# Patient Record
Sex: FEMALE | Race: WHITE | NOT HISPANIC OR LATINO
[De-identification: names, ages, dates, MRNs, and addresses within clinical notes are randomized per-mention and may not be internally consistent; named-entity substitution may affect disease eponyms.]

---

## 2021-03-01 ENCOUNTER — RESULT REVIEW (OUTPATIENT)
Age: 31
End: 2021-03-01

## 2021-04-06 PROBLEM — Z00.00 ENCOUNTER FOR PREVENTIVE HEALTH EXAMINATION: Status: ACTIVE | Noted: 2021-04-06

## 2021-04-07 ENCOUNTER — APPOINTMENT (OUTPATIENT)
Dept: OTOLARYNGOLOGY | Facility: CLINIC | Age: 31
End: 2021-04-07

## 2022-03-01 ENCOUNTER — INPATIENT (INPATIENT)
Facility: HOSPITAL | Age: 32
LOS: 5 days | Discharge: HOME | End: 2022-03-07
Attending: PSYCHIATRY & NEUROLOGY | Admitting: PSYCHIATRY & NEUROLOGY
Payer: COMMERCIAL

## 2022-03-01 VITALS
TEMPERATURE: 98 F | SYSTOLIC BLOOD PRESSURE: 125 MMHG | DIASTOLIC BLOOD PRESSURE: 73 MMHG | HEART RATE: 105 BPM | OXYGEN SATURATION: 100 % | RESPIRATION RATE: 20 BRPM

## 2022-03-01 DIAGNOSIS — F60.3 BORDERLINE PERSONALITY DISORDER: ICD-10-CM

## 2022-03-01 DIAGNOSIS — F90.9 ATTENTION-DEFICIT HYPERACTIVITY DISORDER, UNSPECIFIED TYPE: ICD-10-CM

## 2022-03-01 DIAGNOSIS — F43.10 POST-TRAUMATIC STRESS DISORDER, UNSPECIFIED: ICD-10-CM

## 2022-03-01 DIAGNOSIS — F41.9 ANXIETY DISORDER, UNSPECIFIED: ICD-10-CM

## 2022-03-01 DIAGNOSIS — F33.2 MAJOR DEPRESSIVE DISORDER, RECURRENT SEVERE WITHOUT PSYCHOTIC FEATURES: ICD-10-CM

## 2022-03-01 LAB
ALBUMIN SERPL ELPH-MCNC: 4.8 G/DL — SIGNIFICANT CHANGE UP (ref 3.3–5)
ALP SERPL-CCNC: 40 U/L — SIGNIFICANT CHANGE UP (ref 40–120)
ALT FLD-CCNC: 11 U/L — SIGNIFICANT CHANGE UP (ref 4–33)
ANION GAP SERPL CALC-SCNC: 12 MMOL/L — SIGNIFICANT CHANGE UP (ref 7–14)
APAP SERPL-MCNC: <10 UG/ML — LOW (ref 15–25)
AST SERPL-CCNC: 14 U/L — SIGNIFICANT CHANGE UP (ref 4–32)
BASOPHILS # BLD AUTO: 0.04 K/UL — SIGNIFICANT CHANGE UP (ref 0–0.2)
BASOPHILS NFR BLD AUTO: 0.6 % — SIGNIFICANT CHANGE UP (ref 0–2)
BILIRUB SERPL-MCNC: 0.3 MG/DL — SIGNIFICANT CHANGE UP (ref 0.2–1.2)
BUN SERPL-MCNC: 5 MG/DL — LOW (ref 7–23)
CALCIUM SERPL-MCNC: 9.5 MG/DL — SIGNIFICANT CHANGE UP (ref 8.4–10.5)
CHLORIDE SERPL-SCNC: 105 MMOL/L — SIGNIFICANT CHANGE UP (ref 98–107)
CO2 SERPL-SCNC: 25 MMOL/L — SIGNIFICANT CHANGE UP (ref 22–31)
CREAT SERPL-MCNC: 0.63 MG/DL — SIGNIFICANT CHANGE UP (ref 0.5–1.3)
EGFR: 122 ML/MIN/1.73M2 — SIGNIFICANT CHANGE UP
EOSINOPHIL # BLD AUTO: 0.02 K/UL — SIGNIFICANT CHANGE UP (ref 0–0.5)
EOSINOPHIL NFR BLD AUTO: 0.3 % — SIGNIFICANT CHANGE UP (ref 0–6)
ETHANOL SERPL-MCNC: <10 MG/DL — SIGNIFICANT CHANGE UP
FLUAV AG NPH QL: SIGNIFICANT CHANGE UP
FLUBV AG NPH QL: SIGNIFICANT CHANGE UP
GLUCOSE SERPL-MCNC: 81 MG/DL — SIGNIFICANT CHANGE UP (ref 70–99)
HCG SERPL-ACNC: <5 MIU/ML — SIGNIFICANT CHANGE UP
HCT VFR BLD CALC: 38.7 % — SIGNIFICANT CHANGE UP (ref 34.5–45)
HGB BLD-MCNC: 12.7 G/DL — SIGNIFICANT CHANGE UP (ref 11.5–15.5)
IANC: 3.75 K/UL — SIGNIFICANT CHANGE UP (ref 1.5–8.5)
IMM GRANULOCYTES NFR BLD AUTO: 0.2 % — SIGNIFICANT CHANGE UP (ref 0–1.5)
LYMPHOCYTES # BLD AUTO: 1.88 K/UL — SIGNIFICANT CHANGE UP (ref 1–3.3)
LYMPHOCYTES # BLD AUTO: 30.2 % — SIGNIFICANT CHANGE UP (ref 13–44)
MCHC RBC-ENTMCNC: 29.1 PG — SIGNIFICANT CHANGE UP (ref 27–34)
MCHC RBC-ENTMCNC: 32.8 GM/DL — SIGNIFICANT CHANGE UP (ref 32–36)
MCV RBC AUTO: 88.8 FL — SIGNIFICANT CHANGE UP (ref 80–100)
MONOCYTES # BLD AUTO: 0.52 K/UL — SIGNIFICANT CHANGE UP (ref 0–0.9)
MONOCYTES NFR BLD AUTO: 8.4 % — SIGNIFICANT CHANGE UP (ref 2–14)
NEUTROPHILS # BLD AUTO: 3.75 K/UL — SIGNIFICANT CHANGE UP (ref 1.8–7.4)
NEUTROPHILS NFR BLD AUTO: 60.3 % — SIGNIFICANT CHANGE UP (ref 43–77)
NRBC # BLD: 0 /100 WBCS — SIGNIFICANT CHANGE UP
NRBC # FLD: 0 K/UL — SIGNIFICANT CHANGE UP
PCP SPEC-MCNC: SIGNIFICANT CHANGE UP
PLATELET # BLD AUTO: 285 K/UL — SIGNIFICANT CHANGE UP (ref 150–400)
POTASSIUM SERPL-MCNC: 4.3 MMOL/L — SIGNIFICANT CHANGE UP (ref 3.5–5.3)
POTASSIUM SERPL-SCNC: 4.3 MMOL/L — SIGNIFICANT CHANGE UP (ref 3.5–5.3)
PROT SERPL-MCNC: 7.4 G/DL — SIGNIFICANT CHANGE UP (ref 6–8.3)
RBC # BLD: 4.36 M/UL — SIGNIFICANT CHANGE UP (ref 3.8–5.2)
RBC # FLD: 12.7 % — SIGNIFICANT CHANGE UP (ref 10.3–14.5)
RSV RNA NPH QL NAA+NON-PROBE: SIGNIFICANT CHANGE UP
SALICYLATES SERPL-MCNC: <0.3 MG/DL — LOW (ref 15–30)
SARS-COV-2 RNA SPEC QL NAA+PROBE: SIGNIFICANT CHANGE UP
SODIUM SERPL-SCNC: 142 MMOL/L — SIGNIFICANT CHANGE UP (ref 135–145)
TSH SERPL-MCNC: 0.9 UIU/ML — SIGNIFICANT CHANGE UP (ref 0.27–4.2)
WBC # BLD: 6.22 K/UL — SIGNIFICANT CHANGE UP (ref 3.8–10.5)
WBC # FLD AUTO: 6.22 K/UL — SIGNIFICANT CHANGE UP (ref 3.8–10.5)

## 2022-03-01 PROCEDURE — 99285 EMERGENCY DEPT VISIT HI MDM: CPT

## 2022-03-01 PROCEDURE — 99284 EMERGENCY DEPT VISIT MOD MDM: CPT

## 2022-03-01 RX ORDER — CLONAZEPAM 1 MG
1 TABLET ORAL
Refills: 0 | Status: DISCONTINUED | OUTPATIENT
Start: 2022-03-01 | End: 2022-03-07

## 2022-03-01 RX ORDER — HYDROXYZINE HCL 10 MG
25 TABLET ORAL EVERY 6 HOURS
Refills: 0 | Status: DISCONTINUED | OUTPATIENT
Start: 2022-03-01 | End: 2022-03-07

## 2022-03-01 RX ORDER — GABAPENTIN 400 MG/1
400 CAPSULE ORAL
Refills: 0 | Status: DISCONTINUED | OUTPATIENT
Start: 2022-03-01 | End: 2022-03-04

## 2022-03-01 RX ORDER — LAMOTRIGINE 25 MG/1
200 TABLET, ORALLY DISINTEGRATING ORAL DAILY
Refills: 0 | Status: DISCONTINUED | OUTPATIENT
Start: 2022-03-01 | End: 2022-03-04

## 2022-03-01 RX ORDER — LANOLIN ALCOHOL/MO/W.PET/CERES
3 CREAM (GRAM) TOPICAL AT BEDTIME
Refills: 0 | Status: DISCONTINUED | OUTPATIENT
Start: 2022-03-02 | End: 2022-03-07

## 2022-03-01 NOTE — ED BEHAVIORAL HEALTH ASSESSMENT NOTE - RISK ASSESSMENT
High Acute Suicide Risk elevated risk of harm at this time given multiple past SA, SI, insomnia, depressed mood, impulsive behavior.   protective factors include good therapeutic relationships, good social support from  and motivated to get treatment.   Pt at elevated risk of harm given above risk factors and would benefit from inpatient psych hospitalization.

## 2022-03-01 NOTE — ED PROVIDER NOTE - OBJECTIVE STATEMENT
this is a 31 y.o female with a PMhx of Depression, anxiety, borderline personality disorder, Bipolar? presented to the ED complaining of having suicidal ideations and attempt for the past several months, patient states that she attempted to take a extra dose of klonopin, as a overdose. Patient saw her psychiatrist today whom told her to come in for her Suicidal attempts, patient states that she has attempted in the past, she also has been to the hospital in the past however hasn't been admitted. Patient denies having any nausea, vomiting, diarrhea, consitpation, CP, SOB, FOWLER, headaches, dizziness, fever, or chills. Pt does self harming behavior such as head banging. Patient willing to sign herself into the hospital.

## 2022-03-01 NOTE — ED BEHAVIORAL HEALTH NOTE - BEHAVIORAL HEALTH NOTE
COVID Exposure Screen- Patient  1.	*Have you had a COVID-19 test in the last 90 days?  (  ) Yes   (x  ) No   (  ) Unknown- Reason: _____  IF YES PROCEED TO QUESTION #2. IF NO OR UNKNOWN, PLEASE SKIP TO QUESTION #3.  2.	Date of test(s) and result(s): ________  3.	*Have you tested positive for COVID-19 antibodies? (  ) Yes   ( x) No   (  ) Unknown- Reason: _____  IF YES PROCEED TO QUESTION #4. IF NO or UNKNOWN, PLEASE SKIP TO QUESTION #5.  4.	Date of positive antibody test: ________  5.	*Have you received 2 doses of the COVID-19 vaccine? ( x ) Yes   (  ) No   (  ) Unknown- Reason: _____   IF YES PROCEED TO QUESTION #6. IF NO or UNKNOWN, PLEASE SKIP TO QUESTION #7.  6.	Date of second dose: MAY 2021 - MODERNA________  7.	*In the past 10 days, have you been around anyone with a positive COVID-19 test?* (  ) Yes   (X  ) No   (  ) Unknown- Reason: ____  IF YES PROCEED TO QUESTION #8. IF NO or UNKNOWN, PLEASE SKIP TO QUESTION #13.  8.	Were you within 6 feet of them for at least 15 minutes? (  ) Yes   (  ) No   (  ) Unknown- Reason: _____  9.	Have you provided care for them? (  ) Yes   (  ) No   (  ) Unknown- Reason: ______  10.	Have you had direct physical contact with them (touched, hugged, or kissed them)? (  ) Yes   (  ) No    (  ) Unknown- Reason: _____  11.	Have you shared eating or drinking utensils with them? (  ) Yes   (  ) No    (  ) Unknown- Reason: ____  12.	Have they sneezed, coughed, or somehow gotten respiratory droplets on you? (  ) Yes   (  ) No    (  ) Unknown- Reason: ______  13.	*Have you been out of [place] within the past 10 days?* (  ) Yes   ( X ) No   (  ) Unknown- Reason: _____  IF YES PLEASE ANSWER THE FOLLOWING QUESTIONS:  14.	Which state/country have you been to? ______  15.	Were you there over 24 hours? (  ) Yes   (  ) No    (  ) Unknown- Reason: ______  16.	Date of return to [facility]: ______

## 2022-03-01 NOTE — ED PROVIDER NOTE - CLINICAL SUMMARY MEDICAL DECISION MAKING FREE TEXT BOX
this is a 31 y.o female with a PMhx of Depression, anxiety, borderline personality disorder, Bipolar? presented to the ED complaining of having suicidal ideations and attempt for the past several months. Suicidal ideations-labs, ekg, psych

## 2022-03-01 NOTE — ED ADULT NURSE NOTE - CHIEF COMPLAINT QUOTE
pt was sent is by therapist for si. Pt states she is having thoughts of harming herself, denies plan. pt denies HI,AVH, etoh and drug uses. pmh depression anxiety ,borderline personality.   Juwan ( ) [phone]

## 2022-03-01 NOTE — ED BEHAVIORAL HEALTH ASSESSMENT NOTE - PSYCHIATRIC ISSUES AND PLAN (INCLUDE STANDING AND PRN MEDICATION)
WILL HOLD ADDERALL, C/W melatonin 3mg po qhs,  klonopin 1mg po bid PRN, Lamictal 200mg po daily, Gabapentin 400mg po 4x a day, PRNS: Ativan 2mg IM q6hrs prn, atarax 50mg po q6hrs prn for anxiety WILL HOLD ADDERALL, C/W melatonin 3mg po qhs,  klonopin 1mg po bid PRN, Lamictal 200mg po daily, Gabapentin 400mg po 4x a day, PRNS: Ativan 2mg IM q6hrs prn, atarax 25mg po q6hrs prn for anxiety

## 2022-03-01 NOTE — ED BEHAVIORAL HEALTH ASSESSMENT NOTE - CURRENT MEDICATION
Adderal XR 30mg AM, Adderal IR 10mg 3:30p, Gabapentin 400mg po 4x a week, Klonopin 1mg po bid prn, Lamictal 200mg po daily

## 2022-03-01 NOTE — ED BEHAVIORAL HEALTH ASSESSMENT NOTE - BEHAVIOR
Discussed the importance of blood sugar control in the prevention of ocular complications. Cooperative

## 2022-03-01 NOTE — ED BEHAVIORAL HEALTH NOTE - BEHAVIORAL HEALTH NOTE
GAVINO called pt's , Juwan, at [phone] for collateral information.  No answer; call went to voice mail-GAVINO left a message requesting a return phone call. GAVINO called pt's , Juwan, at [phone] for collateral information.  No answer; call went to voice mail-SW left a message requesting a return phone call.    GAVINO follow up with pt's  again re: collateral information.  Pt's  reports that pt has a history of depression, and that her depression has increased lately.  He states that yesterday he and pt had en emergency meeting with her psychologist, and during that time pt disclosed that she 'fights so hard to stay alive' and that it was 'because of him' that she stays alive.  In addition, pt's  reports that she then 'lost it' and 'couldn't do it anymore'.  Pt's  states that pt's psychiatrist was called as well, and both providers recommended that pt be seen in the ED.  Pt's  also states that pt has not been sleeping well, states she is not at her baseline.  Pt's  reports that pt stated her plan to kill herself was via overdose on her medications.  He states that in the past pt has had thoughts of going in front of a train, however she has not verbalized that manner in years.  Pt's  states the pt is prescribed Gabapentin, Lamotrigine, Adderal, and Klonipin (does not take often).      Therapist is Dr. Terry Malagon, [phone].
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
## 2022-03-01 NOTE — ED BEHAVIORAL HEALTH ASSESSMENT NOTE - DESCRIPTION
calm, cooperative, tearful  Vital Signs Last 24 Hrs  T(C): 36.6 (01 Mar 2022 20:32), Max: 36.6 (01 Mar 2022 20:32)  T(F): 97.8 (01 Mar 2022 20:32), Max: 97.8 (01 Mar 2022 20:32)  HR: 105 (01 Mar 2022 20:32) (105 - 105)  BP: 125/73 (01 Mar 2022 20:32) (125/73 - 125/73)  BP(mean): --  RR: 20 (01 Mar 2022 20:32) (20 - 20)  SpO2: 100% (01 Mar 2022 20:32) (100% - 100%) , unemployed since 2018, [place] descent, lives with parents and  none

## 2022-03-01 NOTE — ED BEHAVIORAL HEALTH ASSESSMENT NOTE - NSBHROSSYSTEMS_PSY_ALL_CORE
denied any headaches, no dizziness, no blurring of vision; no sorethroat; no cough, no fever. no chest pains, no SOB, no palpitations, no abdominal pains, no nausea/ vomiting/ diarrhea, no dysuria, no hesitancy, no arthralgia/ no pruritus. denied any muscle/ joint pains/Psychiatric
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
## 2022-03-01 NOTE — ED ADULT NURSE NOTE - OBJECTIVE STATEMENT
pt is A&Ox4. pt c/o of SI. pt states that she took "4mg of clonipine with intent to sleep and not wake up". pt endorses head trauma r/t banging head into the wall and c/o of headache r/t self-injurious behavior. pt states that family has stated she has been more paranoid recently. pt endorses marijuana use. pt denies HI, auditory hallucinations, visual hallucinations, and tactile hallucinations at this time. pt denies alcohol use. pt respirations even and unlabored with no accessory muscle use. will continue to monitor. pt is A&Ox4. pt c/o of SI, tearful with depressed affect. pt states that she took "4mg of klonopin"  with intent to sleep and not wake up", rx max 2mg daily prn. pt endorses hx of self harm r/t banging head into the wall daily. pt denies LOC. no active bleeding or lacerations. pt states that family has stated she has been more paranoid recently. pt endorses marijuana use daily and denies other illicit drugs/etoh. pt denies HI, auditory hallucinations, visual hallucinations, and tactile hallucinations at this time. PMH anemia and PPH , anxiety, ADHA, anorexia, Bipolar I DO, borderline personality DO. pt hx of multiple SA.

## 2022-03-01 NOTE — ED ADULT TRIAGE NOTE - CHIEF COMPLAINT QUOTE
pt was sent is by therapist for si. Pt states she is having thoughts of harming herself, denies plan. pt denies HI,AVH, etoh and drug uses. pmh depression anxiety ,borderline personality.   Juwan ( ) [phone]

## 2022-03-02 PROCEDURE — 99222 1ST HOSP IP/OBS MODERATE 55: CPT

## 2022-03-02 RX ORDER — DEXTROAMPHETAMINE SACCHARATE, AMPHETAMINE ASPARTATE, DEXTROAMPHETAMINE SULFATE AND AMPHETAMINE SULFATE 1.875; 1.875; 1.875; 1.875 MG/1; MG/1; MG/1; MG/1
10 TABLET ORAL
Refills: 0 | Status: DISCONTINUED | OUTPATIENT
Start: 2022-03-02 | End: 2022-03-07

## 2022-03-02 RX ORDER — DEXTROAMPHETAMINE SACCHARATE, AMPHETAMINE ASPARTATE, DEXTROAMPHETAMINE SULFATE AND AMPHETAMINE SULFATE 1.875; 1.875; 1.875; 1.875 MG/1; MG/1; MG/1; MG/1
20 TABLET ORAL
Refills: 0 | Status: DISCONTINUED | OUTPATIENT
Start: 2022-03-02 | End: 2022-03-07

## 2022-03-02 RX ORDER — LANOLIN ALCOHOL/MO/W.PET/CERES
3 CREAM (GRAM) TOPICAL ONCE
Refills: 0 | Status: DISCONTINUED | OUTPATIENT
Start: 2022-03-02 | End: 2022-03-02

## 2022-03-02 RX ADMIN — GABAPENTIN 400 MILLIGRAM(S): 400 CAPSULE ORAL at 13:33

## 2022-03-02 RX ADMIN — GABAPENTIN 400 MILLIGRAM(S): 400 CAPSULE ORAL at 17:19

## 2022-03-02 RX ADMIN — Medication 1 MILLIGRAM(S): at 01:26

## 2022-03-02 RX ADMIN — DEXTROAMPHETAMINE SACCHARATE, AMPHETAMINE ASPARTATE, DEXTROAMPHETAMINE SULFATE AND AMPHETAMINE SULFATE 10 MILLIGRAM(S): 1.875; 1.875; 1.875; 1.875 TABLET ORAL at 15:16

## 2022-03-02 RX ADMIN — Medication 1 MILLIGRAM(S): at 20:24

## 2022-03-02 RX ADMIN — Medication 3 MILLIGRAM(S): at 20:24

## 2022-03-02 RX ADMIN — GABAPENTIN 400 MILLIGRAM(S): 400 CAPSULE ORAL at 20:24

## 2022-03-02 RX ADMIN — LAMOTRIGINE 200 MILLIGRAM(S): 25 TABLET, ORALLY DISINTEGRATING ORAL at 09:53

## 2022-03-02 RX ADMIN — GABAPENTIN 400 MILLIGRAM(S): 400 CAPSULE ORAL at 09:53

## 2022-03-02 NOTE — BH SOCIAL WORK INITIAL PSYCHOSOCIAL EVALUATION - NSCMSPTSTRENGTHS_PSY_ALL_CORE
Compliance to treatment/Expressive of emotions/Future/goal oriented/Motivated/Strong support system/Supportive family

## 2022-03-02 NOTE — BH PATIENT PROFILE - HOME MEDICATIONS
?  Sorry but I would have to see pt to write any letter outside of normal return to school parameters for COVID.  Ok for appointment   No Rx/Hx Recorded

## 2022-03-02 NOTE — BH PATIENT PROFILE - 
ADDITIONAL INFORMATION
Pt states she took moderna vaccines  in march , may and booster end of december . Pt doesn't remember the dates.
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
## 2022-03-02 NOTE — PSYCHIATRIC REHAB INITIAL EVALUATION - PRIMARY ROLES/RESPONSIBILITIES
The pt reported walking the dog, cleaning her and her 's section in her parent's home, and taking care of herself as her responsibilities.

## 2022-03-02 NOTE — BH PATIENT PROFILE - NSINDCRISISTRIGGER_PSY_ALL_CORE
Being ignored/Change in routine/Needs not being met/Rejection/Someone in personal space/Loud noises/Scared

## 2022-03-02 NOTE — BH INPATIENT PSYCHIATRY ASSESSMENT NOTE - NSICDXBHSECONDARYDX_PSY_ALL_CORE
PTSD (post-traumatic stress disorder)   F43.10  ADHD   F90.9  Borderline personality disorder   F60.3

## 2022-03-02 NOTE — BH INPATIENT PSYCHIATRY ASSESSMENT NOTE - RISK ASSESSMENT
elevated risk of harm at this time given multiple past SA, SI, insomnia, depressed mood, impulsive behavior.   protective factors include good therapeutic relationships, good social support from  and motivated to get treatment.   Pt at elevated risk of harm given above risk factors and would benefit from inpatient psych hospitalization.

## 2022-03-02 NOTE — PSYCHIATRIC REHAB INITIAL EVALUATION - NSBHALCSUBCHOICE_PSY_ALL_CORE
The pt reported cannabis use (daily) to help her sleep and her appetite, and as per her chart, she used heroin/ opiates in the past and ended use in 2019.
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
## 2022-03-02 NOTE — BH INPATIENT PSYCHIATRY ASSESSMENT NOTE - NSBHMETABOLIC_PSY_ALL_CORE_FT
BMI: BMI (kg/m2): 18.3 (03-02-22 @ 01:34)  HbA1c:   Glucose:   BP: 125/73 (03-01-22 @ 20:32) (125/73 - 125/73)  Lipid Panel:
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
## 2022-03-02 NOTE — BH PATIENT PROFILE - CONTRAINDICATIONS & PRECAUTIONS (SELECT ALL THAT APPLY)
Upcoming appointment for lab and Dr. Reggie Hou, please advise lab orders. Thank you.  ABEL TOVAR, 02/08/18, 9:58 AM      Future Appointments  Date Time Provider Rachel Leo   3/26/2018 8:45 AM REF [facility] REF [facility] Ref [facility]   4/2/2018 3:30 PM Patient/surrogate refused vaccine...
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
## 2022-03-02 NOTE — BH INPATIENT PSYCHIATRY ASSESSMENT NOTE - NSBHCHARTREVIEWVS_PSY_A_CORE FT
Vital Signs Last 24 Hrs  T(C): 36.4 (03-02-22 @ 07:54), Max: 36.6 (03-01-22 @ 20:32)  T(F): 97.5 (03-02-22 @ 07:54), Max: 97.8 (03-01-22 @ 20:32)  HR: 105 (03-01-22 @ 20:32) (105 - 105)  BP: 125/73 (03-01-22 @ 20:32) (125/73 - 125/73)  BP(mean): --  RR: 18 (03-02-22 @ 07:54) (18 - 20)  SpO2: 100% (03-01-22 @ 20:32) (100% - 100%)    Orthostatic VS  03-02-22 @ 07:54  Lying BP: --/-- HR: --  Sitting BP: 123/82 HR: 91  Standing BP: 110/81 HR: 96  Site: --  Mode: --  Orthostatic VS  03-02-22 @ 01:34  Lying BP: --/-- HR: --  Sitting BP: 113/76 HR: 97  Standing BP: 121/89 HR: 101  Site: --  Mode: --   Vital Signs Last 24 Hrs  T(C): 36.7 (03-02-22 @ 17:50), Max: 36.7 (03-02-22 @ 17:50)  T(F): 98.1 (03-02-22 @ 17:50), Max: 98.1 (03-02-22 @ 17:50)  HR: --  BP: --  BP(mean): --  RR: 18 (03-02-22 @ 07:54) (18 - 18)  SpO2: --    Orthostatic VS  03-02-22 @ 07:54  Lying BP: --/-- HR: --  Sitting BP: 123/82 HR: 91  Standing BP: 110/81 HR: 96  Site: --  Mode: --  Orthostatic VS  03-02-22 @ 01:34  Lying BP: --/-- HR: --  Sitting BP: 113/76 HR: 97  Standing BP: 121/89 HR: 101  Site: --  Mode: --
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
## 2022-03-02 NOTE — BH PATIENT PROFILE - FALL HARM RISK - UNIVERSAL INTERVENTIONS
Bed in lowest position, wheels locked, appropriate side rails in place/Call bell, personal items and telephone in reach/Instruct patient to call for assistance before getting out of bed or chair/Non-slip footwear when patient is out of bed/[place] to call system/Physically safe environment - no spills, clutter or unnecessary equipment/Purposeful Proactive Rounding/Room/bathroom lighting operational, light cord in reach

## 2022-03-02 NOTE — PSYCHIATRIC REHAB INITIAL EVALUATION - NSBHPRRECOMMEND_PSY_ALL_CORE
The writer met with the patient to orient her to psychiatric rehabilitation staff and services. Throughout the session, the patient was restless, soft one of voice, and cooperative. The patient stated her reason for admission was that she was feeling stressed out and overwhelmed due to her  going back to work after he was working from home during the pandemic. As per the patient’s chart, she was brought in by her  and recommended by her therapist for worsening suicidal thoughts. The patient stated she feels worse being in the hospital and it was an impulsive decision to come to the hospital; the patient added, she wishes she just waited for her therapist to find her a DBT outpatient treatment instead of coming to the hospital. The writer established a psychiatric rehabilitation goal for the patient to work on over the next seven days. The patient’s goal is to identify and utilize 2 coping skills for her suicide/ self-injurious behavior symptoms. Over the next seven days, Psychiatric Rehabilitation staff will utilize individual psychotherapy and psychoeducation to assist the patient in reaching her treatment goal. The patient denies SI/HI/AH/VH. The writer met with the patient to orient her to psychiatric rehabilitation staff and services. Throughout the session, the patient was restless, soft tone of voice, and cooperative. The patient stated her reason for admission was that she was feeling stressed out and overwhelmed due to her  going back to work after he was working from home during the pandemic. As per the patient’s chart, she was brought in by her  and recommended by her therapist for worsening suicidal thoughts. The patient stated she feels worse being in the hospital and it was an impulsive decision to come to the hospital; the patient added, she wishes she just waited for her therapist to find her a DBT outpatient treatment instead of coming to the hospital. The writer established a psychiatric rehabilitation goal for the patient to work on over the next seven days. The patient’s goal is to identify and utilize 2 coping skills for her suicide/ self-injurious behavior symptoms. Over the next seven days, Psychiatric Rehabilitation staff will utilize individual psychotherapy and psychoeducation to assist the patient in reaching her treatment goal. The patient denies SI/HI/AH/VH.

## 2022-03-02 NOTE — PSYCHIATRIC REHAB INITIAL EVALUATION - NSBHPRTOOLBOX_PSY_ALL_CORE
The pt stated she enjoys walking her dogs, speaking with her friends, draw, listen to music and paint.

## 2022-03-02 NOTE — BH PATIENT PROFILE - NSNUTRITIONASSESS_GEN_ALL_CORE
Pt states she is anorexic . Pt is vegetarian/History of eating disorder (bingeing/purging/restricting)/Wants to speak to a dietitian about his/her therapeutic diet

## 2022-03-03 LAB
A1C WITH ESTIMATED AVERAGE GLUCOSE RESULT: 4.9 % — SIGNIFICANT CHANGE UP (ref 4–5.6)
CHOLEST SERPL-MCNC: 196 MG/DL — SIGNIFICANT CHANGE UP
ESTIMATED AVERAGE GLUCOSE: 94 — SIGNIFICANT CHANGE UP
HDLC SERPL-MCNC: 84 MG/DL — SIGNIFICANT CHANGE UP
LIPID PNL WITH DIRECT LDL SERPL: 105 MG/DL — HIGH
NON HDL CHOLESTEROL: 112 MG/DL — SIGNIFICANT CHANGE UP
TRIGL SERPL-MCNC: 35 MG/DL — SIGNIFICANT CHANGE UP

## 2022-03-03 PROCEDURE — 99231 SBSQ HOSP IP/OBS SF/LOW 25: CPT

## 2022-03-03 RX ADMIN — DEXTROAMPHETAMINE SACCHARATE, AMPHETAMINE ASPARTATE, DEXTROAMPHETAMINE SULFATE AND AMPHETAMINE SULFATE 20 MILLIGRAM(S): 1.875; 1.875; 1.875; 1.875 TABLET ORAL at 09:36

## 2022-03-03 RX ADMIN — GABAPENTIN 400 MILLIGRAM(S): 400 CAPSULE ORAL at 08:41

## 2022-03-03 RX ADMIN — LAMOTRIGINE 200 MILLIGRAM(S): 25 TABLET, ORALLY DISINTEGRATING ORAL at 08:41

## 2022-03-03 RX ADMIN — DEXTROAMPHETAMINE SACCHARATE, AMPHETAMINE ASPARTATE, DEXTROAMPHETAMINE SULFATE AND AMPHETAMINE SULFATE 10 MILLIGRAM(S): 1.875; 1.875; 1.875; 1.875 TABLET ORAL at 15:25

## 2022-03-03 RX ADMIN — Medication 3 MILLIGRAM(S): at 21:04

## 2022-03-03 RX ADMIN — GABAPENTIN 400 MILLIGRAM(S): 400 CAPSULE ORAL at 21:04

## 2022-03-03 RX ADMIN — GABAPENTIN 400 MILLIGRAM(S): 400 CAPSULE ORAL at 18:05

## 2022-03-03 RX ADMIN — GABAPENTIN 400 MILLIGRAM(S): 400 CAPSULE ORAL at 13:13

## 2022-03-03 NOTE — BH TREATMENT PLAN - NSTXSUICIDINTERPR_PSY_ALL_CORE
The writer established a psychiatric rehabilitation goal for the patient to work on over the next seven days. The patient’s goal is to identify and utilize 2 coping skills for her suicide/ self-injurious behavior symptoms. Over the next seven days, Psychiatric Rehabilitation staff will utilize individual psychotherapy and psychoeducation to assist the patient in reaching her treatment goal. The patient denies SI/HI/AH/VH.

## 2022-03-03 NOTE — DIETITIAN INITIAL EVALUATION ADULT. - OTHER INFO
Patient admitted to [facility] d/t worsening suicidal thoughts. Patient states her appetite has improved since admitted to the hospital. Was not eating well PTA. Has hx of anorexia nervosa. Reports weight was 108# 3 weeks ago (current weight 100#). Follows a vegetarian diet with food preferences faxed to food and nutrition department. Patient states takes probiotic, Omega 3, iron, Vitamin C, Zinc, folate and B-complex vitamin. Patient complains her "stomach hurts", gets sharp pain in left lower stomach which she feels is from taking medications and not eating. Patient reports having intermittent diarrhea which may be related to anxiety. Patient mentioned she has trouble urinating. Patient with somatic concerns. Told patient she can discuss her physical issues with her doctor.

## 2022-03-03 NOTE — DIETITIAN INITIAL EVALUATION ADULT. - PERTINENT MEDS FT
MEDICATIONS  (STANDING):  amphetamine/dextroamphetamine 10 milliGRAM(s) Oral <User Schedule>  amphetamine/dextroamphetamine XR 20 milliGRAM(s) Oral with breakfast  Azelaic acid/clindamycin/zinc pyrithione 1 Application(s) 1 Application(s) Topical at bedtime  gabapentin 400 milliGRAM(s) Oral four times a day  lamoTRIgine 200 milliGRAM(s) Oral daily  melatonin 3 milliGRAM(s) Oral at bedtime

## 2022-03-03 NOTE — DIETITIAN INITIAL EVALUATION ADULT. - WEIGHT IN KG
Detail Level: Detailed
Additional Notes: R thumb tip wart (under distal nail plate) x years.  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
48.9

## 2022-03-04 PROCEDURE — 99231 SBSQ HOSP IP/OBS SF/LOW 25: CPT

## 2022-03-04 PROCEDURE — 99233 SBSQ HOSP IP/OBS HIGH 50: CPT

## 2022-03-04 RX ORDER — GABAPENTIN 400 MG/1
600 CAPSULE ORAL
Refills: 0 | Status: DISCONTINUED | OUTPATIENT
Start: 2022-03-04 | End: 2022-03-07

## 2022-03-04 RX ORDER — LAMOTRIGINE 25 MG/1
250 TABLET, ORALLY DISINTEGRATING ORAL DAILY
Refills: 0 | Status: DISCONTINUED | OUTPATIENT
Start: 2022-03-04 | End: 2022-03-07

## 2022-03-04 RX ADMIN — GABAPENTIN 400 MILLIGRAM(S): 400 CAPSULE ORAL at 16:13

## 2022-03-04 RX ADMIN — GABAPENTIN 400 MILLIGRAM(S): 400 CAPSULE ORAL at 12:34

## 2022-03-04 RX ADMIN — DEXTROAMPHETAMINE SACCHARATE, AMPHETAMINE ASPARTATE, DEXTROAMPHETAMINE SULFATE AND AMPHETAMINE SULFATE 20 MILLIGRAM(S): 1.875; 1.875; 1.875; 1.875 TABLET ORAL at 08:38

## 2022-03-04 RX ADMIN — GABAPENTIN 600 MILLIGRAM(S): 400 CAPSULE ORAL at 20:35

## 2022-03-04 RX ADMIN — LAMOTRIGINE 200 MILLIGRAM(S): 25 TABLET, ORALLY DISINTEGRATING ORAL at 08:38

## 2022-03-04 RX ADMIN — GABAPENTIN 400 MILLIGRAM(S): 400 CAPSULE ORAL at 08:38

## 2022-03-04 RX ADMIN — DEXTROAMPHETAMINE SACCHARATE, AMPHETAMINE ASPARTATE, DEXTROAMPHETAMINE SULFATE AND AMPHETAMINE SULFATE 10 MILLIGRAM(S): 1.875; 1.875; 1.875; 1.875 TABLET ORAL at 16:13

## 2022-03-04 RX ADMIN — Medication 3 MILLIGRAM(S): at 21:48

## 2022-03-04 NOTE — PROGRESS NOTE ADULT - ASSESSMENT
30 yo F w/ anorexia, ADHD, PTSD, MDD w/ prior history of SA, self injurious behavior (head banging), hx polysubstance use, admitted to [facility] for further psychiatric management, with complaints of chronic intermittent LLQ pain since July, associated w/ loose BMs.     # LLQ pain  - differential is broad  - urinary: c/o L flank tenderness, difficultly w/ urine output though denies dysuria, would obtain U/A to investigate possibility of UTI/pyelonephritis, though currently afebrile, non-toxic appearing, and recent cbc w/o leukocytosis. Recurrent renal stones are another possibility to consider  - gynecologic: patient reports heavy menses/dysmenorrhea, could patient have endometriosis or uterine fibroids? or could pain be due to ovarian pathology (torsion, cyst etc).. Patient should followup w/ gyn as outpatient as planned, consider ultrasound etc for further evaluation, no clear infectious symptoms but STDs/PID could also be considered, patient w/ negative hCG, lower suspicion for ectopic pregnancy  - GI: diverticulitis, malabsorptive diseases (such as celiac), inflammatory bowel disease, irritable bowel syndrome, other colitis could be considered, reports passing gas and having bowel movements so not concerned for bowel obstruction, would obtain stool studies to rule out infectious etiologies, considering obtaining stool inflammatory markers, patient should followup w/ GI as otpt as previously planned, ?role for colonoscopy for further evaluation of persistent symptoms, patient amenable to starting probiotics, should limit use of NSAIDs (counseled patient)  - patient is non-toxic appearing, VSS and does not appear to be in any acute distress, her recent bloodwork is unremarkable and her abdominal exam is without obvious alarming findings, thus there does not appear to be an indication for urgent ED/GI/gyn evaluation or escalation in level of care at this time  - continue to monitor symptoms, can start basic workup as above, anticipate patient can followup w/ GI and gyn as otpt for further evaluation    # Anorexia/ADHD/PTSD/MDD w/ hx of SA  - safety precautions and medication management as per psych  - BMI 18.3, appreciate nutrition recs re: malnutrition

## 2022-03-04 NOTE — BH PSYCHOLOGY - GROUP THERAPY NOTE - NSPSYCHOLGRPCOGINT_PSY_A_CORE FT
Pt attended a Problem Management + (PM+) group. PM+ is a manualized, low intensity intervention developed by the world health organization (WHO). Group today focused on three strategies: (1) Understanding Adversity and (2) Managing Stress. The “Understanding Adversity” strategy involves providing pts with psychoeducation about emotional responses to stress and hardship, including immediate flight or fight responses and long term maladaptive stress responses. Pts are provided validation for the stressful experiences they have gone through and encouraged to practice diaphragmatic breathing exercises. The “Managing Stress” strategy provides pts with instruction and psychoeducation around diaphragmatic breathing. Pts are given the opportunity to practice this breathing exercise and are encouraged to use it when they experience stress and anxiety.

## 2022-03-04 NOTE — PROGRESS NOTE ADULT - SUBJECTIVE AND OBJECTIVE BOX
[facility] Medicine  Pager [phone]    Patient is a 31y old  Female who presents with a chief complaint of Suicidal thoughts    INTERVAL HPI/OVERNIGHT EVENTS:  Asked to evaluate patient for complaints of LLQ pain.   Patient states she has been experiencing LLQ pain since July, intermittent ("comes and goes"), described as sometimes "squeezing" or "throbbing" in quality, also with episodes of "pressure" in her left flank. Has not noted any alleviating factors, has history of heavy NSAID use in the past, denies fever/chills/nausea/vomiting, denies dysuria but reports sometimes needs to "force out urine", does not note any relief in pain w/ defecation, denies melena/hematochezia. Has chronic loose bowel movements, points to type 6 when shown picture of [place] stool chart. Sometimes has early satiety, denies bloating, was supposed to see a GI as otpt for further evaluation but cancelled her appointment in setting of ongoing psychiatric issues. In terms of gynecological history reports LMP ~ Feb 17th, and that menses has been heavy, lasting about week and a half, associated w/ cramps. Has seen gyn in the past, is hoping to be able to get pregnant in future w/ , was considering IVF.     In terms of psychiatric concerns, patient reports history of eating disorder, history of self injurious behavior (banging front and side of head against walls), and suicidal thoughts. She is hopeful that psychiatric treatment is improving her symptoms and that she will be able to feel better.       MEDICATIONS  (STANDING):  amphetamine/dextroamphetamine 10 milliGRAM(s) Oral <User Schedule>  amphetamine/dextroamphetamine XR 20 milliGRAM(s) Oral with breakfast  Azelaic acid/clindamycin/zinc pyrithione 1 Application(s) 1 Application(s) Topical at bedtime  gabapentin 400 milliGRAM(s) Oral four times a day  lamoTRIgine 200 milliGRAM(s) Oral daily  melatonin 3 milliGRAM(s) Oral at bedtime    MEDICATIONS  (PRN):  clonazePAM  Tablet 1 milliGRAM(s) Oral two times a day PRN anxiety  hydrOXYzine hydrochloride 25 milliGRAM(s) Oral every 6 hours PRN Anxiety  LORazepam   Injectable 2 milliGRAM(s) IntraMuscular Once PRN severe agitation    Allergies  No Known Allergies    Intolerances    REVIEW OF SYSTEMS:  Please see interval HPI:    Vital Signs Last 24 Hrs  T(C): 36.2 (04 Mar 2022 07:45), Max: 36.9 (03 Mar 2022 18:09)  T(F): 97.1 (04 Mar 2022 07:45), Max: 98.5 (03 Mar 2022 18:09)  HR: 94  BP: 109/74  BP(mean): --  RR: 18 (04 Mar 2022 07:45) (18 - 18)  SpO2: --  I&O's Detail    PHYSICAL EXAM:  GENERAL: NAD, able to leave group and ambulate independently down hallway for interview/exam, able to stop in middle of visit to rush out to give another patient a hug before they left  HEAD:  NC/AT  EYES: EOMI, clear sclera/conjunctiva  ENMT: wears surgical mask, is concerned about showing poor dentition  NECK: supple, no JVD  NERVOUS SYSTEM:  moving all extremities, SILT grossly, nonfocal  PSYCH: somewhat labile affect, gets tearful at times, fast speech, moves to new topics  CHEST/LUNG: Comfortable on RA, speaking in full sentences, no respiratory distress   ABDOMEN: soft, no guarding, mildly tender LLQ on palpation, mild L CVA tenderness,   EXTREMITIES: no c/c/e    SKIN: +tattoos    LABS:  Comprehensive Metabolic Panel (03.01.22 @ 21:57)   Sodium, Serum: 142 mmol/L   Potassium, Serum: 4.3 mmol/L   Chloride, Serum: 105 mmol/L   Carbon Dioxide, Serum: 25 mmol/L   Anion Gap, Serum: 12 mmol/L   Blood Urea Nitrogen, Serum: 5 mg/dL   Creatinine, Serum: 0.63 mg/dL   Glucose, Serum: 81 mg/dL   Calcium, Total Serum: 9.5 mg/dL   Protein Total, Serum: 7.4 g/dL   Albumin, Serum: 4.8 g/dL   Bilirubin Total, Serum: 0.3 mg/dL   Alkaline Phosphatase, Serum: 40 U/L   Aspartate Aminotransferase (AST/SGOT): 14 U/L   Alanine Aminotransferase (ALT/SGPT): 11 U/L WBC Count: 6.22 K/uL   RBC Count: 4.36 M/uL   Hemoglobin: 12.7 g/dL   Hematocrit: 38.7 %   Mean Cell Volume: 88.8 fL   Mean Cell Hemoglobin: 29.1 pg   Mean Cell Hemoglobin Conc: 32.8 gm/dL   Red Cell Distrib Width: 12.7 %   Platelet Count - Automated: 285 K/uL     CAPILLARY BLOOD GLUCOSE    BLOOD CULTURE    RADIOLOGY & ADDITIONAL TESTS:    EKG personally reviewed: Sinus 91 bpm 3/1    Imaging Personally Reviewed:  [ ] YES   No imaging studies available for review    Consultant(s) Notes Reviewed:      Care Discussed with Consultants/Other Providers:  Dr. York re: differential for LLQ pain, no obvious alarm symptoms on exam, patient does not appear to be in any acute distress, agree w/ otpt GI and gyn followup, can obtain UA r/o urinary tract infections, stool studies r/o infectious diarrhea, evaluate for inflammatory bowel disease, trial probiotics

## 2022-03-05 PROCEDURE — 99231 SBSQ HOSP IP/OBS SF/LOW 25: CPT

## 2022-03-05 RX ADMIN — LAMOTRIGINE 250 MILLIGRAM(S): 25 TABLET, ORALLY DISINTEGRATING ORAL at 08:40

## 2022-03-05 RX ADMIN — GABAPENTIN 600 MILLIGRAM(S): 400 CAPSULE ORAL at 08:11

## 2022-03-05 RX ADMIN — GABAPENTIN 600 MILLIGRAM(S): 400 CAPSULE ORAL at 21:24

## 2022-03-05 RX ADMIN — DEXTROAMPHETAMINE SACCHARATE, AMPHETAMINE ASPARTATE, DEXTROAMPHETAMINE SULFATE AND AMPHETAMINE SULFATE 10 MILLIGRAM(S): 1.875; 1.875; 1.875; 1.875 TABLET ORAL at 15:06

## 2022-03-05 RX ADMIN — Medication 3 MILLIGRAM(S): at 21:24

## 2022-03-05 RX ADMIN — GABAPENTIN 600 MILLIGRAM(S): 400 CAPSULE ORAL at 18:25

## 2022-03-05 RX ADMIN — Medication 1 MILLIGRAM(S): at 21:24

## 2022-03-05 RX ADMIN — DEXTROAMPHETAMINE SACCHARATE, AMPHETAMINE ASPARTATE, DEXTROAMPHETAMINE SULFATE AND AMPHETAMINE SULFATE 20 MILLIGRAM(S): 1.875; 1.875; 1.875; 1.875 TABLET ORAL at 08:11

## 2022-03-05 RX ADMIN — GABAPENTIN 600 MILLIGRAM(S): 400 CAPSULE ORAL at 13:26

## 2022-03-06 PROCEDURE — 99231 SBSQ HOSP IP/OBS SF/LOW 25: CPT

## 2022-03-06 RX ADMIN — Medication 1 MILLIGRAM(S): at 21:36

## 2022-03-06 RX ADMIN — GABAPENTIN 600 MILLIGRAM(S): 400 CAPSULE ORAL at 13:34

## 2022-03-06 RX ADMIN — DEXTROAMPHETAMINE SACCHARATE, AMPHETAMINE ASPARTATE, DEXTROAMPHETAMINE SULFATE AND AMPHETAMINE SULFATE 20 MILLIGRAM(S): 1.875; 1.875; 1.875; 1.875 TABLET ORAL at 08:56

## 2022-03-06 RX ADMIN — DEXTROAMPHETAMINE SACCHARATE, AMPHETAMINE ASPARTATE, DEXTROAMPHETAMINE SULFATE AND AMPHETAMINE SULFATE 10 MILLIGRAM(S): 1.875; 1.875; 1.875; 1.875 TABLET ORAL at 15:46

## 2022-03-06 RX ADMIN — Medication 3 MILLIGRAM(S): at 20:48

## 2022-03-06 RX ADMIN — LAMOTRIGINE 250 MILLIGRAM(S): 25 TABLET, ORALLY DISINTEGRATING ORAL at 08:56

## 2022-03-06 RX ADMIN — GABAPENTIN 600 MILLIGRAM(S): 400 CAPSULE ORAL at 08:56

## 2022-03-06 RX ADMIN — GABAPENTIN 600 MILLIGRAM(S): 400 CAPSULE ORAL at 16:50

## 2022-03-06 RX ADMIN — GABAPENTIN 600 MILLIGRAM(S): 400 CAPSULE ORAL at 20:48

## 2022-03-07 VITALS — RESPIRATION RATE: 19 BRPM | TEMPERATURE: 97 F

## 2022-03-07 PROCEDURE — 99238 HOSP IP/OBS DSCHRG MGMT 30/<: CPT

## 2022-03-07 RX ORDER — LAMOTRIGINE 25 MG/1
10 TABLET, ORALLY DISINTEGRATING ORAL
Qty: 0 | Refills: 0 | DISCHARGE
Start: 2022-03-07

## 2022-03-07 RX ORDER — GABAPENTIN 400 MG/1
2 CAPSULE ORAL
Qty: 0 | Refills: 0 | DISCHARGE
Start: 2022-03-07

## 2022-03-07 RX ORDER — CLONAZEPAM 1 MG
1 TABLET ORAL
Qty: 0 | Refills: 0 | DISCHARGE
Start: 2022-03-07

## 2022-03-07 RX ORDER — DEXTROAMPHETAMINE SACCHARATE, AMPHETAMINE ASPARTATE, DEXTROAMPHETAMINE SULFATE AND AMPHETAMINE SULFATE 1.875; 1.875; 1.875; 1.875 MG/1; MG/1; MG/1; MG/1
1 TABLET ORAL
Qty: 0 | Refills: 0 | DISCHARGE
Start: 2022-03-07

## 2022-03-07 RX ADMIN — GABAPENTIN 600 MILLIGRAM(S): 400 CAPSULE ORAL at 09:10

## 2022-03-07 RX ADMIN — DEXTROAMPHETAMINE SACCHARATE, AMPHETAMINE ASPARTATE, DEXTROAMPHETAMINE SULFATE AND AMPHETAMINE SULFATE 20 MILLIGRAM(S): 1.875; 1.875; 1.875; 1.875 TABLET ORAL at 09:10

## 2022-03-07 RX ADMIN — GABAPENTIN 600 MILLIGRAM(S): 400 CAPSULE ORAL at 12:59

## 2022-03-07 RX ADMIN — LAMOTRIGINE 250 MILLIGRAM(S): 25 TABLET, ORALLY DISINTEGRATING ORAL at 09:42
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
## 2022-03-07 NOTE — BH INPATIENT PSYCHIATRY PROGRESS NOTE - NSICDXBHPRIMARYDX_PSY_ALL_CORE
MDD (major depressive disorder), recurrent episode, severe   F33.2  

## 2022-03-07 NOTE — BH PSYCHOLOGY - CLINICIAN PSYCHOTHERAPY NOTE - NSBHPSYCHOLRESPONSE_PSY_A_CORE
Symptoms reduced/Coping skills acquired/Insight displayed/Accepted support
Symptoms reduced/Coping skills acquired/Insight displayed/Accepted support

## 2022-03-07 NOTE — BH INPATIENT PSYCHIATRY PROGRESS NOTE - NSBHMSESPEECH_PSY_A_CORE
Normal volume, rate, productivity, spontaneity and articulation
Abnormal as indicated, otherwise normal...
Normal volume, rate, productivity, spontaneity and articulation

## 2022-03-07 NOTE — BH INPATIENT PSYCHIATRY PROGRESS NOTE - PRN MEDS
MEDICATIONS  (PRN):  clonazePAM  Tablet 1 milliGRAM(s) Oral two times a day PRN anxiety  hydrOXYzine hydrochloride 25 milliGRAM(s) Oral every 6 hours PRN Anxiety  LORazepam   Injectable 2 milliGRAM(s) IntraMuscular Once PRN severe agitation  

## 2022-03-07 NOTE — BH INPATIENT PSYCHIATRY PROGRESS NOTE - NSTXSUICIDINTERMD_PSY_ALL_CORE
medication management

## 2022-03-07 NOTE — BH DISCHARGE NOTE NURSING/SOCIAL WORK/PSYCH REHAB - NSDCPRRECOMMEND_PSY_ALL_CORE
Psychiatric Rehabilitation staff recommends that patient engage in outpatient services for continued medication and symptom management. Upon discharge, patient has an appointment scheduled with Dr. Dalton.

## 2022-03-07 NOTE — BH INPATIENT PSYCHIATRY DISCHARGE NOTE - NSDCMRMEDTOKEN_GEN_ALL_CORE_FT
clonazePAM 1 mg oral tablet: 1 tab(s) orally 2 times a day, As needed, anxiety  dextroamphetamine-amphetamine 10 mg oral tablet: 1 tab(s) orally once a day  dextroamphetamine-amphetamine 20 mg oral capsule, extended release: 1 cap(s) orally once a day (before a meal)  freetext medication     -: 1 application topically once a day (at bedtime)  gabapentin 300 mg oral capsule: 2 cap(s) orally 4 times a day  lamoTRIgine 25 mg oral tablet: 10 tab(s) orally once a day

## 2022-03-07 NOTE — BH INPATIENT PSYCHIATRY PROGRESS NOTE - NSBHCONSBHPROVDETAILS_PSY_A_CORE  FT
Spoke with Dr Lazo's medical assistant Corrine [phone]
Spoke with Dr Lazo's medical assistant Corrine [phone]
Spoke with Dr Lazo's medical assistant Corrine [phone]
Voicemail left for Dr Lazo [phone]
Spoke with Dr Lazo's medical assistant Corrine [phone]

## 2022-03-07 NOTE — BH DISCHARGE NOTE NURSING/SOCIAL WORK/PSYCH REHAB - PATIENT PORTAL LINK FT
You can access the FollowMyHealth Patient Portal offered by [facility] by registering at the following website: [email or web address]. By joining [employer]’s FollowMyHealth portal, you will also be able to view your health information using other applications (apps) compatible with our system.

## 2022-03-07 NOTE — BH PSYCHOLOGY - GROUP THERAPY NOTE - NSPSYCHOLGRPDBTGOAL_PSY_A_CORE
reduce mood and affective lability/reduce impulsive self-defeating behavior
reduce mood and affective lability/reduce impulsive self-defeating behavior

## 2022-03-07 NOTE — BH DISCHARGE NOTE NURSING/SOCIAL WORK/PSYCH REHAB - NSCDUDCCRISIS_PSY_A_CORE
[facility]  1 (076) [facility]-WELL ([phone])  Text "WELL" to 85882  Website: www.[employer]/.[employer] 1 (468) 371-BLMQ (1648) Website: www.safehorizon.org/.National Suicide Prevention Lifeline 0 (299) 257-3166/.  [employer]  1 (766) LIF[employer] (286-7023)/.  [facility]  [address] [place] [address] [phone]   Hours:  Monday through Friday from 9 AM to 3 PM/.  [employer]  [employer] [facility] Crisis Line  2 [phone], Option 1 normal...

## 2022-03-07 NOTE — BH INPATIENT PSYCHIATRY DISCHARGE NOTE - CASE SUMMARY
Patient presented with worsening depression and anxiety. She also endorsed passive SI with no intent or plan. She was continued on Adderall 10mg Q330pm and XR 20mg QD, Gabapentin 600mg QID and Lamotrigine 350mg QD. Doses were verified by patient's pharmacy and outpatient provider. Patient initially placed a 3-day letter but then retracted it. She was attending groups and social with peers. Patient did not engage in self injurious behaviors. Patient found DBT to be very helpful. Patient was focused on discharge and placed a 3-day letter once again on 3/5 and was discharged on letter.  Patient's symptoms gradually improved over the course of the hospitalization.  There was notable improvement in depressed mood and SI. She was able to discuss using radical acceptance when she is home and was motivated to continue DBT outpatient. There were no behavioral problems on the unit. Patient did not become agitated and did not require emergent intramuscular medications.  On day of discharge, the patient has improved significantly and no longer requires inpatient treatment and care. Patient denies all suicidal and aggressive ideation, intent and plan. Patient is not judged to be an acute danger to self or others at this time.

## 2022-03-07 NOTE — BH INPATIENT PSYCHIATRY DISCHARGE NOTE - NSBHMETABOLIC_PSY_ALL_CORE_FT
BMI: BMI (kg/m2): 18.3 (03-02-22 @ 01:34)  HbA1c: A1C with Estimated Average Glucose Result: 4.9 % (03-03-22 @ 10:51)    Glucose:   BP: --  Lipid Panel: Date/Time: 03-03-22 @ 10:51  Cholesterol, Serum: 196  Direct LDL: --  HDL Cholesterol, Serum: 84  Total Cholesterol/HDL Ration Measurement: --  Triglycerides, Serum: 35

## 2022-03-07 NOTE — BH INPATIENT PSYCHIATRY PROGRESS NOTE - NSBHFUPINTERVALCCFT_PSY_A_CORE
follow up mood
Patient seen for follow up for depression and SI.
follow up mood

## 2022-03-07 NOTE — BH DISCHARGE NOTE NURSING/SOCIAL WORK/PSYCH REHAB - DISCHARGE INSTRUCTIONS AFTERCARE APPOINTMENTS
In order to check the location, date, or time of your aftercare appointment, please refer to your Discharge Instructions Document given to you upon leaving the hospital.  If you have lost the instructions please call [phone]

## 2022-03-07 NOTE — BH PSYCHOLOGY - GROUP THERAPY NOTE - NSBHPSYCHOLPARTICIPCOMMENT_PSY_A_CORE FT
The pt arrived to group on time and was engaged and cooperative. The pt introduced herself to the group and in response to whether she used any coping skills over the past days, the pt said she’s been using radical acceptance and distress tolerance skills. She was attentive and engaged throughout the mindfulness exercise and during the distress tolerance module. In the skills portion of the group, the pt spontaneously talked many times and responded when prompted. The pt talked about how she’s struggled in interpersonal discussions because she would not be able to use crisis survival skills, would respond based on her emotions and say something hurtful, and then it would escalate. She said that afterwards she would always regret doing this. The pt also provided support and encouragement to other pts during the group. 
Pt was cooperative and attentive throughout PM+ group, and participated in meditation and mindfulness exercise. Pt spontaneously volunteered a relevant personal experiences, and actively contributed to group discussion. 
The pt arrived to group on time and was engaged and cooperative. The pt had to step out for some time and missed the skills check-in. She came back in time for the mindfulness exercise. She was attentive and engaged throughout the mindfulness exercise and during the distress tolerance module. In the skills portion of the group, the pt spontaneously talked many times and responded when prompted. The pt talked about how she’s struggled with radical acceptance because it’s difficult for her to accept things that have felt unjust. The writer validated the pt and provided her with psychoeducation on how radical acceptance is not approval and how turning her mind and being more willing can help her be more accepting with things that are difficult to accept.

## 2022-03-07 NOTE — BH INPATIENT PSYCHIATRY DISCHARGE NOTE - HOSPITAL COURSE
On admission interview, patient presented with worsening depression and anxiety. She also endorsed passive SI with no intent or plan. She was continued on   Patient was started back on Zyprexa 5mg and titrated dose to 7.5mg with good effect.  Patient also started on Lexapro 10mg and it was titrated to 20mg QD. During titration, patient had no reported/observed adverse effects, such as akathisia, tremor, EPS.    Patient's symptoms gradually improved over the course of the hospitalization.  There was notable improvement in depressed mood, feelings of worthlessness and tearfulness with decrease in paranoia. Patient expressed concerns of returning back to working from home and taking care of her two children but is also hopeful her  will be more helpful and understanding.  There were no behavioral problems on the unit.  Patient did not become agitated and did not require emergent intramuscular medications. Patient was mostly isolative to self, minimal interaction with peers on the unit. She never endorsed any SI/HI.  On day of discharge, the patient has improved significantly and no longer requires inpatient treatment and care. Patient denies all suicidal and aggressive ideation, intent and plan. Patient is not judged to be an acute danger to self or others at this time.  Patient was educated about side effects of Zyprexa including EPS, TD, and metabolic syndrome.  Side effects of Lexapro including serotonin syndrome, risk for increased suicidality and withdrawal sx if abrupt discontinuation. Patient’s family agreed with discharge plan and felt that it was safe for patient to return home.   Patient and family were provided with emergency phone numbers and were instructed to call 911 or go to the nearest ER for worsening of symptoms, dangerousness to self/others.       The patient has a low acute risk and low chronic risk of self-harm. Protective factors include no SI, no hx of SA, no SIB, no substance abuse, no current mood sx, no hopelessness, no access to firearms.  Chronic risk factors include chronic course of presenting psychotic illness and hx of non-compliance. Immediate risk was minimized by inpatient admission to a safe environment with appropriate supervision and limited access to lethal means. Future risk was minimized before discharge by treatment of acute psychotic symptoms, maximizing outpatient support, providing relevant patient education, discussing emergency procedures, and ensuring close follow-up. The patient remains at a low acute risk of self-harm, and such risk cannot be further ameliorated by continued inpatient treatment and the patient is therefore appropriate for discharge.     On admission interview, patient presented with worsening depression and anxiety. She also endorsed passive SI with no intent or plan. She was continued on Adderall 10mg Q330pm and XR 20mg QD, Gabapentin 600mg QID and Lamotrigine 350mg QD. Doses were verified by patient's pharmacy and outpatient provider. Patient initially placed a 3-day letter but then retracted it. She was attending groups and social with peers. Patient did not engage in self injurious behaviors. Patient found DBT to be very helpful. Patient was focused on discharge and placed a 3-day letter once again on 3/5 and was discharged on letter.  Patient's symptoms gradually improved over the course of the hospitalization.  There was notable improvement in depressed mood and SI. She was able to discuss using radical acceptance when she is home and was motivated to continue DBT outpatient. There were no behavioral problems on the unit. Patient did not become agitated and did not require emergent intramuscular medications.  On day of discharge, the patient has improved significantly and no longer requires inpatient treatment and care. Patient denies all suicidal and aggressive ideation, intent and plan. Patient is not judged to be an acute danger to self or others at this time.  Patient was educated about side effects of current medication regimen and importance of compliance. Patient’s family agreed with discharge plan and felt that it was safe for patient to return home.   Patient and family were provided with emergency phone numbers and were instructed to call 911 or go to the nearest ER for worsening of symptoms, dangerousness to self/others.     The patient has a low acute risk and low chronic risk of self-harm. Protective factors include no SI, no substance abuse, no current mood sx, no hopelessness, no access to firearms.  Chronic risk factors include chronic course of presenting  illness. Immediate risk was minimized by inpatient admission to a safe environment with appropriate supervision and limited access to lethal means. Future risk was minimized before discharge by treatment of acute symptoms, maximizing outpatient support, providing relevant patient education, discussing emergency procedures, and ensuring close follow-up. The patient remains at a low acute risk of self-harm, and such risk cannot be further ameliorated by continued inpatient treatment and the patient is therefore appropriate for discharge.
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
## 2022-03-07 NOTE — BH INPATIENT PSYCHIATRY PROGRESS NOTE - NSBHCONSULTIPREASON_PSY_A_CORE
danger to self; mental illness expected to respond to inpatient care
other reason

## 2022-03-07 NOTE — BH PSYCHOLOGY - GROUP THERAPY NOTE - NSPSYCHOLGRPDBTPT_PSY_A_CORE
stable mood and affect in group/no self-destructive impulses/behaviors
stable mood and affect in group/no self-destructive impulses/behaviors

## 2022-03-07 NOTE — BH INPATIENT PSYCHIATRY PROGRESS NOTE - NSTXANXGOAL_PSY_ALL_CORE
Be able to participate in activities despite lingering anxiety/panic

## 2022-03-07 NOTE — BH INPATIENT PSYCHIATRY PROGRESS NOTE - NSBHATTESTSEENBY_PSY_A_CORE
attending Psychiatrist without NP/Trainee
NP without Attending Psychiatrist
attending Psychiatrist without NP/Trainee

## 2022-03-07 NOTE — BH PSYCHOLOGY - GROUP THERAPY NOTE - NSBHPSYCHOLRESPONSE_PSY_A_CORE
Symptoms reduced/Coping skills acquired/Insight displayed/Accepted support
Coping skills acquired/Accepted support
Coping skills acquired/Accepted support

## 2022-03-07 NOTE — BH INPATIENT PSYCHIATRY PROGRESS NOTE - NSBHINPTBILLING_PSY_ALL_CORE
16045 - Hospital Discharge Day Management; 30 min or less
62471 - Inpatient Low Complexity
50085 - Inpatient Low Complexity
66137 - Inpatient Low Complexity
93218 - Inpatient Low Complexity

## 2022-03-07 NOTE — BH PSYCHOLOGY - GROUP THERAPY NOTE - NSPSYCHOLGRPDBTPROB_PSY_A_CORE
anxiety/depressed mood/emotional dysregulation/impulsive behaviors
anxiety/depressed mood/emotional dysregulation/impulsive behaviors

## 2022-03-07 NOTE — BH DISCHARGE NOTE NURSING/SOCIAL WORK/PSYCH REHAB - NSDCPRGOAL_PSY_ALL_CORE
Writer met with patient to discuss patient’s progress throughout her inpatient stay. Upon admission, patient presented with worsening anxiety and depression. During her inpatient stay, patient was visible on the unit, participated in most groups, and established positive peer relationships. At discharge, patient presents as calm and reported feeling that she benefitted from inpatient treatment. Patient met her psychiatric rehabilitation goal evidenced by her ability to identify listening to music, taking a cold shower, and going for a walk as effective coping skills. Patient exhibits medication compliance, good ADLs, and good behavioral control. Patient denied SI, HI, AH, and VH.

## 2022-03-07 NOTE — BH SAFETY PLAN - ASK FOR HELP NAME 1
PROCEDURAL PRE-SEDATION ASSESSMENT      Procedure date: 6/23/2021      Indication for procedure/Pre-diagnosis:  Low back pain    Planned Procedure:  Radiofrequency Ablation Bilateral    CHIEF COMPLAINT  No chief complaint on file.      MEDICAL HISTORY   Significant medical/surgical history: NO  Significant family history: NO  Smoking history: Reviewed  Alcohol/drug abuse: Reviewed  Anesthesia history: Reviewed  Family anesthesia history: Reviewed  Possible pregnancy (LMP): NO  Airway risk history (sleep apnea, stridor, snoring, neck arthritis):NO  Previous complications: None  Cardiac history: NO  Respiratory history: NO    PHYSICAL EXAM  Heart: NORMAL findings  Lungs: NORMAL findings  Neuro: NORMAL findings  Vascular: NORMAL findings    OTHER FINDINGS  Reviewed current medications and allergies: YES  Reviewed pertinent lab/diagnostic tests: YES    RISK STATUS: ASA 2 Normal patient with mild systemic disease    AIRWAY ASSESSMENT: Mallampati Class II - Soft palate uvula fauces pillars visible.  No difficulty.    PLAN FOR SEDATION: IV Sedation    EKG Monitoring: YES    REASSESSMENT IMMEDIATELY PRIOR TO PROCEDURE:   Patient remains a candidate for the planned sedation and procedure.    Vitals:    06/23/21 1637   BP: (!) 156/80   Pulse: 80   Resp: 16   Temp:        Relevant updated exam, including heart, lungs, neurological and vascular was conducted and  H&P reviewed. There are no relevant changes with regards to planned course of treatment. I have personally reviewed the latest vitals signs. Procedure was explained again, risks/benefits and alternatives were discussed.This patient is accepted for procedure/surgery. Patient agreed to proceed.    Assessing Physician: Kelvin Amezcua MD                         Time: 4:50 PM     Rick Marino Kanu

## 2022-03-07 NOTE — BH INPATIENT PSYCHIATRY PROGRESS NOTE - NSTXANXINTERMD_PSY_ALL_CORE
medication management 

## 2022-03-07 NOTE — BH PSYCHOLOGY - GROUP THERAPY NOTE - NSPSYCHOLGRPDBTTOL_PSY_A_CORE FT
Today's skill focused on the distress tolerance module: turning the mind and willingness. The group began with introductions, skills check in, and a mindfulness exercise. Next, the  introduced the module and discussed the skills involved. The  read parts of the handouts out loud, and after each part of the handout, the group engaged in a discussion about what they thought about the skill discussed, whether they've tried applying it and/or any difficulties with applying such a skill. The group ended by reviewing the practice worksheet and encouraging group members to practice the skill outside of group. 
Today's skill focused on the distress tolerance module: crisis survival skills and STOP. The group began with introductions, skills check in, and a mindfulness exercise. Next, the  introduced the module and discussed the skills involved. Then, some members of the group took turns reading out of the handout. After each part of the handout, the group engaged in a discussion about what they thought about the skill discussed, whether they've tried applying it and/or any difficulties with applying such a skill. The group went over the STOP handout and took turns reading out loud. The group ended by reviewing the practice worksheet and encouraging group members to practice the skill outside of group.
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
## 2022-03-07 NOTE — BH INPATIENT PSYCHIATRY PROGRESS NOTE - NSDCCRITERIA_PSY_ALL_CORE
symptom management, safety planning, care coordination 

## 2022-03-07 NOTE — BH INPATIENT PSYCHIATRY PROGRESS NOTE - CURRENT MEDICATION
MEDICATIONS  (STANDING):  amphetamine/dextroamphetamine 10 milliGRAM(s) Oral <User Schedule>  amphetamine/dextroamphetamine XR 20 milliGRAM(s) Oral with breakfast  Azelaic acid/clindamycin/zinc pyrithione 1 Application(s) 1 Application(s) Topical at bedtime  gabapentin 600 milliGRAM(s) Oral four times a day  lamoTRIgine 250 milliGRAM(s) Oral daily  melatonin 3 milliGRAM(s) Oral at bedtime    MEDICATIONS  (PRN):  clonazePAM  Tablet 1 milliGRAM(s) Oral two times a day PRN anxiety  hydrOXYzine hydrochloride 25 milliGRAM(s) Oral every 6 hours PRN Anxiety  LORazepam   Injectable 2 milliGRAM(s) IntraMuscular Once PRN severe agitation  
MEDICATIONS  (STANDING):  amphetamine/dextroamphetamine 10 milliGRAM(s) Oral <User Schedule>  amphetamine/dextroamphetamine XR 20 milliGRAM(s) Oral with breakfast  Azelaic acid/clindamycin/zinc pyrithione 1 Application(s) 1 Application(s) Topical at bedtime  gabapentin 400 milliGRAM(s) Oral four times a day  lamoTRIgine 200 milliGRAM(s) Oral daily  melatonin 3 milliGRAM(s) Oral at bedtime    MEDICATIONS  (PRN):  clonazePAM  Tablet 1 milliGRAM(s) Oral two times a day PRN anxiety  hydrOXYzine hydrochloride 25 milliGRAM(s) Oral every 6 hours PRN Anxiety  LORazepam   Injectable 2 milliGRAM(s) IntraMuscular Once PRN severe agitation  
MEDICATIONS  (STANDING):  amphetamine/dextroamphetamine 10 milliGRAM(s) Oral <User Schedule>  amphetamine/dextroamphetamine XR 20 milliGRAM(s) Oral with breakfast  Azelaic acid/clindamycin/zinc pyrithione 1 Application(s) 1 Application(s) Topical at bedtime  gabapentin 600 milliGRAM(s) Oral four times a day  lamoTRIgine 250 milliGRAM(s) Oral daily  melatonin 3 milliGRAM(s) Oral at bedtime    MEDICATIONS  (PRN):  clonazePAM  Tablet 1 milliGRAM(s) Oral two times a day PRN anxiety  hydrOXYzine hydrochloride 25 milliGRAM(s) Oral every 6 hours PRN Anxiety  LORazepam   Injectable 2 milliGRAM(s) IntraMuscular Once PRN severe agitation  
MEDICATIONS  (STANDING):  amphetamine/dextroamphetamine 10 milliGRAM(s) Oral <User Schedule>  amphetamine/dextroamphetamine XR 20 milliGRAM(s) Oral with breakfast  Azelaic acid/clindamycin/zinc pyrithione 1 Application(s) 1 Application(s) Topical at bedtime  gabapentin 400 milliGRAM(s) Oral four times a day  lamoTRIgine 200 milliGRAM(s) Oral daily  melatonin 3 milliGRAM(s) Oral at bedtime    MEDICATIONS  (PRN):  clonazePAM  Tablet 1 milliGRAM(s) Oral two times a day PRN anxiety  hydrOXYzine hydrochloride 25 milliGRAM(s) Oral every 6 hours PRN Anxiety  LORazepam   Injectable 2 milliGRAM(s) IntraMuscular Once PRN severe agitation  
MEDICATIONS  (STANDING):  amphetamine/dextroamphetamine 10 milliGRAM(s) Oral <User Schedule>  amphetamine/dextroamphetamine XR 20 milliGRAM(s) Oral with breakfast  Azelaic acid/clindamycin/zinc pyrithione 1 Application(s) 1 Application(s) Topical at bedtime  gabapentin 600 milliGRAM(s) Oral four times a day  lamoTRIgine 250 milliGRAM(s) Oral daily  melatonin 3 milliGRAM(s) Oral at bedtime    MEDICATIONS  (PRN):  clonazePAM  Tablet 1 milliGRAM(s) Oral two times a day PRN anxiety  hydrOXYzine hydrochloride 25 milliGRAM(s) Oral every 6 hours PRN Anxiety  LORazepam   Injectable 2 milliGRAM(s) IntraMuscular Once PRN severe agitation

## 2022-03-07 NOTE — BH INPATIENT PSYCHIATRY PROGRESS NOTE - NSBHFUPINTERVALHXFT_PSY_A_CORE
chart reviewed. case discussed with nursing staff. Over this interval: patient adherent to meds over this interval, reports stable mood. endorses poor sleep / 2 hours, and has adequate energy. exhibiting overproductive speech. denies suicidal ideation and homicidal ideation. denies avh. Tells me that she saw her outpatient psychiatrist and was relieved when she was told she had BPD and not bipolar.
Chart reviewed and case discussed with treatment team. No events reported overnight. Sleep and appetite is fair. Patient denies any SI and is future oriented. She stated that she needs more structure in her everyday life and is motivated to continue improving DBT skills. She reported that distress tolerance and radical acceptance have been helpful for her. Patient is compliant with medications, no adverse effects reported.  
chart reviewed. case discussed with nursing staff. Over this interval: no behavioral issues. patient cooperative and pleasant on approach. reports getting significantly better sleep last night. reports stable mood. denies suicidal ideation. denies ah. 
Chart reviewed and case discussed with treatment team. No events reported overnight. Sleep and appetite is fair. Patient stated that she feels more future oriented and insightful. She stated that she feels ready for continued outpatient DBT. She has been attending groups and finds "radical acceptance" helpful. Patient is leaving on a 3-day letter. She denies SI.  Patient is compliant with medications, no adverse effects reported.  
Chart reviewed and case discussed with treatment team. No events reported overnight. Sleep and appetite is fair. Patient was calmer and less dysregulated today. She denies any SI and is future oriented. She has been attending groups and wants to continue DBT groups outpatient. She denies any urges to self harm. She retracted her 3-day letter and is in agreement to continue treatment.  Patient is compliant with medications, no adverse effects reported.

## 2022-03-07 NOTE — BH INPATIENT PSYCHIATRY DISCHARGE NOTE - NSDCCPCAREPLAN_GEN_ALL_CORE_FT
PRINCIPAL DISCHARGE DIAGNOSIS  Diagnosis: Major depressive disorder, recurrent episode, severe  Assessment and Plan of Treatment:

## 2022-03-07 NOTE — BH PSYCHOLOGY - GROUP THERAPY NOTE - NSBHPSYCHOLGRPTYPE_PSY_A_CORE
How Severe Is Your Skin Lesion?: mild
Have Your Skin Lesions Been Treated?: not been treated
Is This A New Presentation, Or A Follow-Up?: Growth
Cognitive Behavioral Coping Skills
DBT Life Skills
DBT Life Skills

## 2022-03-07 NOTE — BH INPATIENT PSYCHIATRY PROGRESS NOTE - NSBHMETABOLIC_PSY_ALL_CORE_FT
BMI: BMI (kg/m2): 18.3 (03-02-22 @ 01:34)  HbA1c: A1C with Estimated Average Glucose Result: 4.9 % (03-03-22 @ 10:51)    Glucose:   BP: --  Lipid Panel: Date/Time: 03-03-22 @ 10:51  Cholesterol, Serum: 196  Direct LDL: --  HDL Cholesterol, Serum: 84  Total Cholesterol/HDL Ration Measurement: --  Triglycerides, Serum: 35  
BMI: BMI (kg/m2): 18.3 (03-02-22 @ 01:34)  HbA1c: A1C with Estimated Average Glucose Result: 4.9 % (03-03-22 @ 10:51)    Glucose:   BP: 125/73 (03-01-22 @ 20:32) (125/73 - 125/73)  Lipid Panel: Date/Time: 03-03-22 @ 10:51  Cholesterol, Serum: 196  Direct LDL: --  HDL Cholesterol, Serum: 84  Total Cholesterol/HDL Ration Measurement: --  Triglycerides, Serum: 35  
BMI: BMI (kg/m2): 18.3 (03-02-22 @ 01:34)  HbA1c: A1C with Estimated Average Glucose Result: 4.9 % (03-03-22 @ 10:51)    Glucose:   BP: 125/73 (03-01-22 @ 20:32) (125/73 - 125/73)  Lipid Panel: Date/Time: 03-03-22 @ 10:51  Cholesterol, Serum: 196  Direct LDL: --  HDL Cholesterol, Serum: 84  Total Cholesterol/HDL Ration Measurement: --  Triglycerides, Serum: 35

## 2022-03-07 NOTE — BH INPATIENT PSYCHIATRY PROGRESS NOTE - NSBHCHARTREVIEWVS_PSY_A_CORE FT
Vital Signs Last 24 Hrs  T(C): 36.6 (03-05-22 @ 09:38), Max: 36.6 (03-04-22 @ 18:13)  T(F): 97.8 (03-05-22 @ 09:38), Max: 97.9 (03-04-22 @ 18:13)  HR: --  BP: --  BP(mean): --  RR: --  SpO2: --    Orthostatic VS  03-05-22 @ 09:38  Lying BP: --/-- HR: --  Sitting BP: 106/78 HR: 107  Standing BP: 108/73 HR: 106  Site: --  Mode: --  Orthostatic VS  03-04-22 @ 07:45  Lying BP: --/-- HR: --  Sitting BP: 109/74 HR: 94  Standing BP: 117/77 HR: 100  Site: --  Mode: --  
Vital Signs Last 24 Hrs  T(C): 36.2 (03-04-22 @ 07:45), Max: 36.9 (03-03-22 @ 18:09)  T(F): 97.1 (03-04-22 @ 07:45), Max: 98.5 (03-03-22 @ 18:09)  HR: --  BP: --  BP(mean): --  RR: 18 (03-04-22 @ 07:45) (18 - 18)  SpO2: --    Orthostatic VS  03-04-22 @ 07:45  Lying BP: --/-- HR: --  Sitting BP: 109/74 HR: 94  Standing BP: 117/77 HR: 100  Site: --  Mode: --  Orthostatic VS  03-03-22 @ 08:18  Lying BP: 108/76 HR: 91  Sitting BP: 95/68 HR: 94  Standing BP: --/-- HR: --  Site: --  Mode: --  
Vital Signs Last 24 Hrs  T(C): 36.7 (03-06-22 @ 07:54), Max: 36.7 (03-06-22 @ 07:54)  T(F): 98.1 (03-06-22 @ 07:54), Max: 98.1 (03-06-22 @ 07:54)  HR: --  BP: --  BP(mean): --  RR: --  SpO2: --    Orthostatic VS  03-06-22 @ 07:54  Lying BP: --/-- HR: --  Sitting BP: 96/63 HR: 98  Standing BP: 98/65 HR: 100  Site: --  Mode: --  Orthostatic VS  03-05-22 @ 09:38  Lying BP: --/-- HR: --  Sitting BP: 106/78 HR: 107  Standing BP: 108/73 HR: 106  Site: --  Mode: --  
Vital Signs Last 24 Hrs  T(C): 36.3 (03-03-22 @ 08:18), Max: 36.7 (03-02-22 @ 17:50)  T(F): 97.3 (03-03-22 @ 08:18), Max: 98.1 (03-02-22 @ 17:50)  HR: --  BP: --  BP(mean): --  RR: 16 (03-03-22 @ 08:18) (16 - 16)  SpO2: --    Orthostatic VS  03-03-22 @ 08:18  Lying BP: 108/76 HR: 91  Sitting BP: 95/68 HR: 94  Standing BP: --/-- HR: --  Site: --  Mode: --  Orthostatic VS  03-02-22 @ 07:54  Lying BP: --/-- HR: --  Sitting BP: 123/82 HR: 91  Standing BP: 110/81 HR: 96  Site: --  Mode: --  Orthostatic VS  03-02-22 @ 01:34  Lying BP: --/-- HR: --  Sitting BP: 113/76 HR: 97  Standing BP: 121/89 HR: 101  Site: --  Mode: --  
Vital Signs Last 24 Hrs  T(C): 36.2 (03-07-22 @ 07:54), Max: 36.6 (03-06-22 @ 17:41)  T(F): 97.1 (03-07-22 @ 07:54), Max: 97.9 (03-06-22 @ 17:41)  HR: --  BP: --  BP(mean): --  RR: 19 (03-07-22 @ 07:54) (19 - 19)  SpO2: --    Orthostatic VS  03-07-22 @ 07:54  Lying BP: --/-- HR: --  Sitting BP: 122/82 HR: 115  Standing BP: 109/79 HR: 117  Site: --  Mode: --  Orthostatic VS  03-06-22 @ 07:54  Lying BP: --/-- HR: --  Sitting BP: 96/63 HR: 98  Standing BP: 98/65 HR: 100  Site: --  Mode: --

## 2022-08-29 ENCOUNTER — RESULT REVIEW (OUTPATIENT)
Age: 32
End: 2022-08-29
